# Patient Record
Sex: FEMALE | URBAN - METROPOLITAN AREA
[De-identification: names, ages, dates, MRNs, and addresses within clinical notes are randomized per-mention and may not be internally consistent; named-entity substitution may affect disease eponyms.]

---

## 2023-10-03 ENCOUNTER — LAB (OUTPATIENT)
Dept: LAB | Facility: LAB | Age: 23
End: 2023-10-03
Payer: COMMERCIAL

## 2023-10-03 DIAGNOSIS — Z77.21 CONTACT WITH AND (SUSPECTED) EXPOSURE TO POTENTIALLY HAZARDOUS BODY FLUIDS: Primary | ICD-10-CM

## 2023-10-03 LAB — HBV SURFACE AB SER-ACNC: 4.8 MIU/ML

## 2023-10-03 PROCEDURE — 36415 COLL VENOUS BLD VENIPUNCTURE: CPT

## 2023-10-04 LAB — HBV SURFACE AG SERPL QL IA: NONREACTIVE

## 2023-12-05 ENCOUNTER — LAB (OUTPATIENT)
Dept: LAB | Facility: LAB | Age: 23
End: 2023-12-05
Payer: COMMERCIAL

## 2023-12-05 DIAGNOSIS — N94.6 DYSMENORRHEA, UNSPECIFIED: ICD-10-CM

## 2023-12-05 DIAGNOSIS — Z83.3 FAMILY HISTORY OF DIABETES MELLITUS: ICD-10-CM

## 2023-12-05 DIAGNOSIS — R53.83 OTHER FATIGUE: Primary | ICD-10-CM

## 2023-12-05 LAB
ALBUMIN SERPL BCP-MCNC: 4.4 G/DL (ref 3.4–5)
ALP SERPL-CCNC: 52 U/L (ref 33–110)
ALT SERPL W P-5'-P-CCNC: 13 U/L (ref 7–45)
ANION GAP SERPL CALC-SCNC: 14 MMOL/L (ref 10–20)
AST SERPL W P-5'-P-CCNC: 15 U/L (ref 9–39)
BILIRUB SERPL-MCNC: 0.3 MG/DL (ref 0–1.2)
BUN SERPL-MCNC: 20 MG/DL (ref 6–23)
CALCIUM SERPL-MCNC: 9.4 MG/DL (ref 8.6–10.6)
CHLORIDE SERPL-SCNC: 106 MMOL/L (ref 98–107)
CO2 SERPL-SCNC: 24 MMOL/L (ref 21–32)
CREAT SERPL-MCNC: 0.74 MG/DL (ref 0.5–1.05)
EST. AVERAGE GLUCOSE BLD GHB EST-MCNC: 97 MG/DL
FSH SERPL-ACNC: 8.1 IU/L
GFR SERPL CREATININE-BSD FRML MDRD: >90 ML/MIN/1.73M*2
GLUCOSE SERPL-MCNC: 82 MG/DL (ref 74–99)
HBA1C MFR BLD: 5 %
LH SERPL-ACNC: 4.4 IU/L
POTASSIUM SERPL-SCNC: 4.2 MMOL/L (ref 3.5–5.3)
PROT SERPL-MCNC: 7.2 G/DL (ref 6.4–8.2)
SODIUM SERPL-SCNC: 140 MMOL/L (ref 136–145)
TSH SERPL-ACNC: 1.77 MIU/L (ref 0.44–3.98)

## 2023-12-05 PROCEDURE — 80053 COMPREHEN METABOLIC PANEL: CPT

## 2023-12-05 PROCEDURE — 83036 HEMOGLOBIN GLYCOSYLATED A1C: CPT

## 2023-12-05 PROCEDURE — 83002 ASSAY OF GONADOTROPIN (LH): CPT

## 2023-12-05 PROCEDURE — 83001 ASSAY OF GONADOTROPIN (FSH): CPT

## 2023-12-05 PROCEDURE — 36415 COLL VENOUS BLD VENIPUNCTURE: CPT

## 2023-12-05 PROCEDURE — 84443 ASSAY THYROID STIM HORMONE: CPT

## 2025-03-28 ENCOUNTER — HOSPITAL ENCOUNTER (OUTPATIENT)
Dept: RADIOLOGY | Facility: HOSPITAL | Age: 25
Discharge: HOME | End: 2025-03-28
Payer: COMMERCIAL

## 2025-03-28 DIAGNOSIS — N93.9 ABNORMAL UTERINE AND VAGINAL BLEEDING, UNSPECIFIED: ICD-10-CM

## 2025-03-28 PROCEDURE — 76856 US EXAM PELVIC COMPLETE: CPT

## 2025-06-16 ENCOUNTER — APPOINTMENT (OUTPATIENT)
Dept: OBSTETRICS AND GYNECOLOGY | Facility: CLINIC | Age: 25
End: 2025-06-16
Payer: COMMERCIAL

## 2025-06-16 VITALS — SYSTOLIC BLOOD PRESSURE: 118 MMHG | WEIGHT: 260.2 LBS | DIASTOLIC BLOOD PRESSURE: 79 MMHG | HEART RATE: 88 BPM

## 2025-06-16 DIAGNOSIS — Z23 NEED FOR HPV VACCINATION: ICD-10-CM

## 2025-06-16 DIAGNOSIS — N92.6 IRREGULAR MENSES: Primary | ICD-10-CM

## 2025-06-16 PROCEDURE — 99203 OFFICE O/P NEW LOW 30 MIN: CPT | Performed by: NURSE PRACTITIONER

## 2025-06-16 PROCEDURE — 90471 IMMUNIZATION ADMIN: CPT | Performed by: NURSE PRACTITIONER

## 2025-06-16 PROCEDURE — 1036F TOBACCO NON-USER: CPT | Performed by: NURSE PRACTITIONER

## 2025-06-16 PROCEDURE — 99213 OFFICE O/P EST LOW 20 MIN: CPT | Mod: 25 | Performed by: NURSE PRACTITIONER

## 2025-06-16 RX ORDER — SERTRALINE HYDROCHLORIDE 150 MG/1
CAPSULE ORAL
COMMUNITY

## 2025-06-16 ASSESSMENT — PATIENT HEALTH QUESTIONNAIRE - PHQ9
1. LITTLE INTEREST OR PLEASURE IN DOING THINGS: SEVERAL DAYS
2. FEELING DOWN, DEPRESSED OR HOPELESS: NOT AT ALL
SUM OF ALL RESPONSES TO PHQ9 QUESTIONS 1 AND 2: 1

## 2025-06-16 ASSESSMENT — ENCOUNTER SYMPTOMS
MUSCULOSKELETAL NEGATIVE: 0
RESPIRATORY NEGATIVE: 0
EYES NEGATIVE: 0
HEMATOLOGIC/LYMPHATIC NEGATIVE: 0
CONSTITUTIONAL NEGATIVE: 0
CARDIOVASCULAR NEGATIVE: 0
ENDOCRINE NEGATIVE: 0
NEUROLOGICAL NEGATIVE: 0
GASTROINTESTINAL NEGATIVE: 0
PSYCHIATRIC NEGATIVE: 0
ALLERGIC/IMMUNOLOGIC NEGATIVE: 0

## 2025-06-16 ASSESSMENT — PAIN SCALES - GENERAL: PAINLEVEL_OUTOF10: 0-NO PAIN

## 2025-06-16 NOTE — PROGRESS NOTES
Roxi Lieberman is a 25 y.o. female who presents for New Patient Visit (Patient Is here today in clinic for multiple concerns . Irregular cycles, bleeding between cycles , Headaches /Patient stated she was bleeding for 30 days . Pain with sex /No pain /LMP Sometime in may /No falls /Need pap /STD declined ).    HPI    Patient presents to discuss AUB and establish care. She reports periods have been irregular since 2020; occasionally skipping months, now having 30-40 day cycles with intermittent spotting in between periods. Typically has about 5 days of bleeding with second day being heaviest. She had 1 episode of prolonged bleeding for ~30 days in March. At that time, she had work up at OSH that included pelvic US and blood work. Pelvic US report visible in chart and unremarkable, however report notes that ovaries are not discretely visualized. States she had normal TSH, testosterone, and HgbA1C results, but unable to see lab records. Reports regular cycles from menarche (age 9) through early 20s. Additionally, patient reports hirsutism.     She also endorses pelvic floor dysfunction, was previously going to pelvic floor PT for urinary incontinence but was unable to keep up with recommended exercises. She denies any prior sexual activity (penetrative or otherwise). She reports pain with use of vibrator. No prior pelvic exam or pap testing.     No significant family history. Desires HPV vaccine series.     OB History    No obstetric history on file.        Objective   /79   Pulse 88   Wt 118 kg (260 lb 3.2 oz)   LMP  (LMP Unknown)   Physical Exam  Constitutional:       Appearance: Normal appearance.   Cardiovascular:      Rate and Rhythm: Normal rate and regular rhythm.   Pulmonary:      Effort: Pulmonary effort is normal.      Breath sounds: Normal breath sounds.   Abdominal:      Palpations: Abdomen is soft.      Tenderness: There is no abdominal tenderness.   Neurological:      General: No focal  deficit present.      Mental Status: She is alert and oriented to person, place, and time. Mental status is at baseline.   Skin:     General: Skin is warm and dry.   Psychiatric:         Mood and Affect: Mood normal.         Behavior: Behavior normal.         Thought Content: Thought content normal.         Judgment: Judgment normal.       Assessment/Plan   Diagnoses and all orders for this visit:  Irregular menses  Need for HPV vaccination  -     HPV 9-valent vaccine (GARDASIL 9)    Discussed likely diagnosis of PCOS given irregular menses and hirsutism, as well as typical first line management with use of contraception to regulate periods or use of provera to induce menses if amenorrheic x3-4 months. Also discussed option of spironolactone for treatment of hirsutism. Patient declines treatment at this time, will readdress if menses become more infrequent.     Offered repeat US, patient would like to defer at this time but open to repeating if symptoms worsen. Will send request of records to prior provider to review serum testing and repeat if indicated.     Discussed recommendation for pap starting at the age of 21 regardless of sexual activity given small potential risk of HPV exposure, through shared decision making will defer for now. Encouraged patient to return when ready.     Encouraged to return to pelvic floor therapy, patient interested but will defer for now as she is planning to move soon. She will reach out when desired to restart. Discussed option of nutrition referral, patient defers for now, but will reach out when ready.     Follow up in about 1 year (around 6/16/2026), or if symptoms worsen or fail to improve, for annual exam.  Lesly Dumont, APRN-CNM, APRN-CNP

## 2025-06-16 NOTE — PATIENT INSTRUCTIONS
HPV Vaccine Schedule  Dose #1 (given today)  Dose #2 1-2 months after first dose  Dose #3 6 months after first dose

## 2025-06-16 NOTE — PROGRESS NOTES
Hpv vaccine per Lesly MCARTHUR   Given IM into right deltoid  Supplied by office  Patient tolerated well  VIS given     LOT #: [id number]  Expiration date: 1/28/27

## 2025-07-02 ENCOUNTER — APPOINTMENT (OUTPATIENT)
Dept: PRIMARY CARE | Facility: CLINIC | Age: 25
End: 2025-07-02
Payer: COMMERCIAL

## 2025-07-02 VITALS
WEIGHT: 275.9 LBS | BODY MASS INDEX: 40.87 KG/M2 | TEMPERATURE: 97.1 F | HEIGHT: 69 IN | DIASTOLIC BLOOD PRESSURE: 78 MMHG | SYSTOLIC BLOOD PRESSURE: 112 MMHG | OXYGEN SATURATION: 97 % | HEART RATE: 101 BPM

## 2025-07-02 DIAGNOSIS — F32.A MILD DEPRESSION: ICD-10-CM

## 2025-07-02 DIAGNOSIS — G43.009 MIGRAINE WITHOUT AURA AND WITHOUT STATUS MIGRAINOSUS, NOT INTRACTABLE: ICD-10-CM

## 2025-07-02 DIAGNOSIS — Z00.00 ROUTINE GENERAL MEDICAL EXAMINATION AT A HEALTH CARE FACILITY: Primary | ICD-10-CM

## 2025-07-02 DIAGNOSIS — E55.9 VITAMIN D DEFICIENCY: ICD-10-CM

## 2025-07-02 DIAGNOSIS — R21 RASH: ICD-10-CM

## 2025-07-02 DIAGNOSIS — F41.9 ANXIETY: ICD-10-CM

## 2025-07-02 PROBLEM — G43.909 MIGRAINE: Status: ACTIVE | Noted: 2025-07-02

## 2025-07-02 PROBLEM — N94.6 DYSMENORRHEA: Status: ACTIVE | Noted: 2024-02-08

## 2025-07-02 PROBLEM — M62.838 LEVATOR SPASM: Status: ACTIVE | Noted: 2024-02-08

## 2025-07-02 PROCEDURE — 3008F BODY MASS INDEX DOCD: CPT | Performed by: NURSE PRACTITIONER

## 2025-07-02 PROCEDURE — 1036F TOBACCO NON-USER: CPT | Performed by: NURSE PRACTITIONER

## 2025-07-02 PROCEDURE — 99385 PREV VISIT NEW AGE 18-39: CPT | Performed by: NURSE PRACTITIONER

## 2025-07-02 RX ORDER — SERTRALINE HYDROCHLORIDE 100 MG/1
200 TABLET, FILM COATED ORAL DAILY
Qty: 180 TABLET | Refills: 1 | Status: SHIPPED | OUTPATIENT
Start: 2025-07-02 | End: 2025-12-29

## 2025-07-02 ASSESSMENT — ENCOUNTER SYMPTOMS
MYALGIAS: 0
COUGH: 0
BLOOD IN STOOL: 0
PALPITATIONS: 0
UNEXPECTED WEIGHT CHANGE: 0
APPETITE CHANGE: 0
DYSURIA: 0
DIARRHEA: 0
NAUSEA: 0
VOMITING: 0
FREQUENCY: 0
BACK PAIN: 0

## 2025-07-02 ASSESSMENT — PAIN SCALES - GENERAL: PAINLEVEL_OUTOF10: 0-NO PAIN

## 2025-07-02 NOTE — PROGRESS NOTES
Subjective   Luisana Lo is a 25 y.o. female and is here for a comprehensive physical exam. The patient reports establish care.    History of Present Illness  The patient presents for a new patient visit.    She has a history of migraines, which she manages with aspirin and ibuprofen due to adverse reactions to prescription medications. Lifestyle modifications have also been beneficial in controlling her migraines. She has not consulted a neurologist for her migraines. Triggers: Menstrual migraines, foods, artificial sweeteners, caffeine  Never on OCPs due to potential side effects.  Migraine pain occures in Right eye, Right parietal and occiput, cheek/jaw.  +nausea, +vomit, espescially when triggered by eating  +photo, +phono  No AURA-  Pain++  Weakness in arms and legs during migraine episodes  2019 - Head CT normal  Her diet is varied, and she maintains a regular eating schedule to manage her migraines, aiming for 30 g of protein per meal. She primarily consumes animal-based proteins, including red meat, chicken, eggs, cheese, and nuts. She avoids caffeine due to its association with her migraines - 30 minutes after consumption migraines onset. She experiences nausea, vomiting, light sensitivity, and weakness in her arms and legs during migraine episodes. Her last attempt at migraine medication was two years ago.    She is currently under the care of a gynecologist for a potential diagnosis of polycystic ovary syndrome (PCOS), as she exhibits symptoms such as dysmenorrhea, amenorrhea, and prolonged menstrual periods. She has expressed concerns about excessive body hair and hair loss on her head, which her gynecologist attributes to genetics. Her testosterone levels are normal. She has not undergone blood work with her gynecologist. She has never had a Pap smear. She has not been diagnosed with premenstrual dysphoric disorder (PMDD) but does experience some premenstrual syndrome (PMS) symptoms. Her last  menstrual period started on 06/15/2025.    She has been dealing with depression for approximately a decade, managed with sertraline. She does not see a psychiatrist but rather her general practitioner. She reports that her depression is currently well-managed, although she experienced significant stress in June 2025 due to personal issues. She has previously tried counseling for her depression, which was more beneficial for her anxiety. She has also tried other antidepressants but discontinued them due to side effects. She is hesitant to take medications in general due to her tendency to experience side effects. She has a history of obsessive-compulsive disorder (OCD) from childhood, which no longer significantly impacts her life. She has been diagnosed with major depressive disorder and persistent depressive disorder. She also has generalized anxiety disorder, which is managed with counseling. She had a prescription for Xanax for panic attacks but was able to manage this through counseling and does not believe she ever took it. She takes sertraline 200 mg daily, an increased dose from her usual 150 mg due to recent stressors.  Currently lives with mom. Has been trying to move out. Mom recently broke her leg last month.     Has been taking trazodone fpr insomnia x 5 years du to difficulty falling and staying asleep. Sleep hygeine changes were not effective.    She has been experiencing chronic fatigue since the age of 15, which her previous doctor attributed to depression and a side effect of her antidepressant. 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 She has undergone blood work in the past, including thyroid and testosterone tests, which were normal. Her A1c level was also normal. She maintains a balanced diet and takes supplements including melatonin as needed, magnesium glycinate, zinc, vitamin D, and folate.    She has concerns about her ear, which  she believes may be due to wax buildup. She has been informed that her ear canal is unusually small and that she has small eustachian tubes. She has had moderate wax issues in the past but not to the extent that it affected her hearing. She has tried home remedies such as oil drops in the ear, which have provided some relief. She uses Flonase for her eustachian tube issues, which takes a few days to work. She does not experience itchy eyes or a runny nose but does have a cough. She has a history of recurrent ear infections as a child but not as an adult.    She experiences difficulty breathing during cardio exercises, which she believes may be due to exercise-induced asthma, although this has not been pursued further. She also experiences shin splints and ankle pain due to poor knees and ankles. She does not experience lightheadedness or dizziness but does report chest pain and difficulty breathing in and out. She does not experience respiratory issues when ill or with colds. She had bronchitis frequently as a child but not as an adult. She has not had recent imaging of her chest or heart.    She experiences constipation due to pelvic floor issues, which she has had since childhood. This has resulted in hemorrhoids since childhood. Reports lack of anus opening. She has jobs where she would not use the bathroom for up to 10 hours due to stress, leading to urinary tract infections (UTIs) and constipation. She has not been sexually active but has experienced pain during masturbation. She has started physical therapy for this issue but was unable to complete it.    She has noticed patches on her scalp that resemble her father's scalp psoriasis patches. She uses tea tree oil and eucalyptus shampoos, which seem to help. She has not been tested for autoimmune disorders.    She experiences ankle swelling, which she attributes to sodium intake and not sleeping with her feet elevated. The swelling improves with elevation or  overnight, except when it occurs on an airplane, where it takes longer to resolve. She also experiences numbness, tingling, and pain when the swelling occurs, which resolves once the swelling subsides.    Last blood work 3/2025, reviewed with patient.  Reports borderline elevation of cholesterol in the past.    Walks for exercise - tolerates fairly.  Weight training.    GYNECOLOGICAL HISTORY:  Last Menstrual Period: 06/15/2025  Menstrual Pain: Yes  Has never had a PAP smear    PAST SURGICAL HISTORY:  She had chickenpox at age 15. She had her wisdom teeth removed and a mole excised.    FAMILY HISTORY  Her father and grandfather had skin cancer. Depression and anxiety are present in her sister, brother, father, and all four grandparents. Her maternal side has a history of colon cancer, though her mother and maternal grandparents do not have it. Her maternal grandfather had heart failure in his 60s, and her maternal grandmother also had heart failure. Diverticulitis/diverticulosis is present in both paternal and maternal grandmothers. Both sides of her family have a history of diabetes. Both parents have high blood pressure, and her father has high cholesterol. Her father experienced an allergic reaction to statins, leading to significant muscular atrophy and immunocompromised status. Late onset trigeminal neuralgia is present in her maternal family, with diagnoses occurring in their 40s to 70s.         History:  LMP: Patient's last menstrual period was 06/15/2025 (approximate).  Menopause at NA years  Last pap date: never  Abnormal pap? not asked  : 0    Vision has been good  Dental UTD  Plans to see dental at [employer] - works there    Reaction to Sodium lauryl sulfate, sodium lauryl sulfoacetate and sodium laurite detergents    Supplements:   Melatonin as needed  Magnesium glycinate 50% value  Zinc 40mg  Folate 400mg  Calcium taken with Vit D  Vit D 2000 international units ?    Do you have pain that bothers you  in your daily life? no    Review of Systems   Constitutional:  Positive for fatigue. Negative for appetite change and unexpected weight change.   HENT:  Positive for ear pain. Negative for congestion and hearing loss.    Eyes:  Negative for visual disturbance.   Respiratory:  Positive for shortness of breath. Negative for cough.    Cardiovascular:  Positive for leg swelling. Negative for chest pain and palpitations.   Gastrointestinal:  Positive for abdominal pain and constipation. Negative for blood in stool, diarrhea, nausea and vomiting.   Genitourinary:  Negative for dysuria, frequency and urgency.   Musculoskeletal:  Positive for arthralgias. Negative for back pain and myalgias.   Skin:  Positive for rash.   Neurological:  Positive for headaches. Negative for syncope.   Psychiatric/Behavioral:  The patient is nervous/anxious.         Objective   Physical Exam  Vitals and nursing note reviewed.   Constitutional:       General: She is not in acute distress.     Appearance: Normal appearance. She is not toxic-appearing.   HENT:      Head: Normocephalic.      Right Ear: Tympanic membrane, ear canal and external ear normal.      Left Ear: Tympanic membrane, ear canal and external ear normal.      Ears:      Comments: Narrow canals     Nose: Nose normal.      Mouth/Throat:      Mouth: Mucous membranes are moist.      Pharynx: Oropharynx is clear.     Eyes:      Conjunctiva/sclera: Conjunctivae normal.     Neck:      Thyroid: No thyromegaly.     Cardiovascular:      Rate and Rhythm: Normal rate and regular rhythm.      Pulses: Normal pulses.      Heart sounds: Normal heart sounds. No murmur heard.     Comments: Trace to 1+  Pulmonary:      Effort: Pulmonary effort is normal. No respiratory distress.      Breath sounds: Normal breath sounds.   Abdominal:      General: Bowel sounds are normal.      Palpations: Abdomen is soft.      Tenderness: There is no abdominal tenderness.     Musculoskeletal:         General:  Normal range of motion.      Cervical back: Neck supple.      Right lower le+ Pitting Edema present.      Left lower le+ Pitting Edema present.   Lymphadenopathy:      Cervical: No cervical adenopathy.     Skin:     General: Skin is warm and dry.      Capillary Refill: Capillary refill takes less than 2 seconds.      Findings: Rash (scalp: thinning hair, pink patches) present.     Neurological:      General: No focal deficit present.      Gait: Gait normal.     Psychiatric:         Mood and Affect: Mood normal.         Judgment: Judgment normal.         Assessment/Plan   Healthy female exam.   Assessment & Plan  1. Migraines: Chronic.  - Manages migraines with aspirin and ibuprofen due to side effects from prescription medications.  - Experiences nausea, vomiting, light sensitivity, and weakness in arms and legs during migraine episodes.  - Magnesium supplementation between 200-400 mg recommended for benefits on mood, sleep, and migraines.  - Last tried prescription medications for migraines about 2 years ago.    2. Potential polycystic ovary syndrome (PCOS).  - Working with a gynecologist for possible PCOS diagnosis.  - Recent blood work for thyroid and testosterone levels were normal.    3. Depression: Acute on Chronic.  - Managing depression with sertraline for about a decade, currently on 200 mg daily due to increased stress.  - Counseling was effective for anxiety but not for depression.  - Considering blood work to check vitamin D, folic acid, B12, and iron levels due to chronic fatigue.    4. Chronic fatigue.  - Experienced chronic fatigue since age 15, possibly related to depression and side effects of antidepressants.  - Blood work will be conducted to check vitamin D levels, folic acid, B12, and iron levels.  - Previous blood work for thyroid and testosterone levels were normal.    5. Ear canal issues.  - Reports difficulty hearing in one ear, possibly due to wax buildup or small ear canal size.  -  Flonase recommended for use.  - No wax observed during examination.    6. Exercise-induced asthma.  - Experiences difficulty breathing during cardio exercises, possibly due to exercise-induced asthma.  - No history of asthma medication use.    7. Pelvic floor dysfunction.  - Pelvic floor issues leading to constipation and hemorrhoids since childhood.  - Started physical therapy but was unable to complete it.  - Gynecologist will write a recommendation for resuming physical therapy.    8. Scalp psoriasis.  - Uses tea tree oil and eucalyptus shampoos for patches resembling father's scalp psoriasis.  - No formal diagnosis of autoimmune disorders.    9. Ankle swelling.  - Experiences ankle swelling, especially after long periods of sitting or traveling.  - Swelling improves with elevation.  - No other areas of swelling reported.    Follow-up  - Blood work to check vitamin D, folic acid, B12, and iron levels.  - Prescription for sertraline 200 mg daily sent to [facility].     2. Patient Counseling:  --Nutrition: Stressed importance of moderation in sodium/caffeine intake, saturated fat and cholesterol, caloric balance, sufficient intake of fresh fruits, vegetables, fiber, calcium, iron, and 1 mg of folate supplement per day (for females capable of pregnancy).  --Discussed the issue of estrogen replacement, calcium supplement, and the daily use of baby aspirin.  --Exercise: Stressed the importance of regular exercise.   --Substance Abuse: Discussed cessation/primary prevention of tobacco, alcohol, or other drug use; driving or other dangerous activities under the influence; availability of treatment for abuse.    --Sexuality: Discussed sexually transmitted diseases, partner selection, use of condoms, avoidance of unintended pregnancy  and contraceptive alternatives.   --Injury prevention: Discussed safety belts, safety helmets, smoke detector, smoking near bedding or upholstery.   --Dental health: Discussed importance of  regular tooth brushing, flossing, and dental visits.  --Immunizations reviewed.  --Discussed benefits of screening colonoscopy.  --After hours service discussed with patient  3. Discussed the patient's BMI with her.  The BMI is above average. The patient received Provided instructions on dietary changes  Provided instructions on exercise  Advised to Increase physical activity because they have an above normal BMI.  4. Follow up next physical in 1 year    Patient Instructions   It is important that you have yearly check-ups with your healthcare provider to ensure that you stay up to date on your health, screenings, and vaccinations, even if you feel healthy.     Make sure you eat a diet rich in fruits, vegetables, nuts, beans, whole grains, lean meat, eggs, calcium, and good fats (i.e. olive oil, avocado baked fish). AVOID a diet that is high in red meat, trans- and saturated fats, sweetened beverages, and refined grains (i.e. white bread, rice, sweetened cereals).     Stay hydrated with at least 64 ounces of water daily.    Exercise most days per week, for at least 30-45 minutes per session.     Be sure to wear sunscreen of SPF of 15 or higher if you are going to be outside.    Stress management and coping skills are important to manage our stress levels. Some tips include keep in mind that stress isn't a bad thing, talk about your problems, prioritize your responsibilities, focus on the basics, don't put all your eggs in one basket, set aside time for yourself, and keep things in perspective.     Stay up to date with annual eye exams and twice yearly dental exams.    Recommend annual flu vaccination, in addition to age appropriate recommended vaccines.    PAP smear is recommended every 2-3 years for women 21-29 and every 3-5 years for women 30-65.  Continue to follow up with Gynecology for PCOS work up and to schedule PAP    Blood work today to evaluate for underlying causes of your symptoms    Continue sertraline  daily  Check vitamin levels to rule out other underlying triggers  You should be taking magnesium glycinate 200 to 400mg daily (if tolerated - can cause diarrhea)  Continue vitamin D    Follow up for evaluation of depression/anxiety in 6 months, sooner as needed     This medical note was created with the assistance of artificial intelligence (AI) for documentation purposes. The content has been reviewed and confirmed by the healthcare provider for accuracy and completeness. Patient consented to the use of audio recording and use of AI during their visit.

## 2025-07-02 NOTE — PATIENT INSTRUCTIONS
It is important that you have yearly check-ups with your healthcare provider to ensure that you stay up to date on your health, screenings, and vaccinations, even if you feel healthy.     Make sure you eat a diet rich in fruits, vegetables, nuts, beans, whole grains, lean meat, eggs, calcium, and good fats (i.e. olive oil, avocado baked fish). AVOID a diet that is high in red meat, trans- and saturated fats, sweetened beverages, and refined grains (i.e. white bread, rice, sweetened cereals).     Stay hydrated with at least 64 ounces of water daily.    Exercise most days per week, for at least 30-45 minutes per session.     Be sure to wear sunscreen of SPF of 15 or higher if you are going to be outside.    Stress management and coping skills are important to manage our stress levels. Some tips include keep in mind that stress isn't a bad thing, talk about your problems, prioritize your responsibilities, focus on the basics, don't put all your eggs in one basket, set aside time for yourself, and keep things in perspective.     Stay up to date with annual eye exams and twice yearly dental exams.    Recommend annual flu vaccination, in addition to age appropriate recommended vaccines.    PAP smear is recommended every 2-3 years for women 21-29 and every 3-5 years for women 30-65.  Continue to follow up with Gynecology for PCOS work up and to schedule PAP    Blood work today to evaluate for underlying causes of your symptoms    Continue sertraline daily  Check vitamin levels to rule out other underlying triggers  You should be taking magnesium glycinate 200 to 400mg daily (if tolerated - can cause diarrhea)  Continue vitamin D    Follow up for evaluation of depression/anxiety in 6 months, sooner as needed

## 2025-07-03 DIAGNOSIS — E61.1 IRON DEFICIENCY: Primary | ICD-10-CM

## 2025-07-03 RX ORDER — FERROUS SULFATE 325(65) MG
325 TABLET, DELAYED RELEASE (ENTERIC COATED) ORAL
Qty: 30 TABLET | Refills: 5 | Status: SHIPPED | OUTPATIENT
Start: 2025-07-03 | End: 2025-12-30

## 2025-07-05 LAB
25(OH)D3+25(OH)D2 SERPL-MCNC: 34 NG/ML (ref 30–100)
ALBUMIN SERPL-MCNC: 4.5 G/DL (ref 3.6–5.1)
ALP SERPL-CCNC: 50 U/L (ref 31–125)
ALT SERPL-CCNC: 13 U/L (ref 6–29)
ANA PAT SER IF-IMP: ABNORMAL
ANA PAT SER IF-IMP: ABNORMAL
ANA SER QL IF: POSITIVE
ANA TITR SER IF: ABNORMAL TITER
ANA TITR SER IF: ABNORMAL TITER
ANION GAP SERPL CALCULATED.4IONS-SCNC: 12 MMOL/L (CALC) (ref 7–17)
AST SERPL-CCNC: 15 U/L (ref 10–30)
BILIRUB SERPL-MCNC: 0.4 MG/DL (ref 0.2–1.2)
BUN SERPL-MCNC: 14 MG/DL (ref 7–25)
CALCIUM SERPL-MCNC: 9.3 MG/DL (ref 8.6–10.2)
CHLORIDE SERPL-SCNC: 105 MMOL/L (ref 98–110)
CO2 SERPL-SCNC: 23 MMOL/L (ref 20–32)
CREAT SERPL-MCNC: 0.72 MG/DL (ref 0.5–0.96)
CRP SERPL-MCNC: <3 MG/L
DSDNA AB SER-ACNC: ABNORMAL [IU]/ML
EGFRCR SERPLBLD CKD-EPI 2021: 117 ML/MIN/1.73M2
ENA RNP AB SER-ACNC: ABNORMAL
ENA SM AB SER IA-ACNC: ABNORMAL
ENA SM+RNP AB SER IA-ACNC: ABNORMAL
ERYTHROCYTE [SEDIMENTATION RATE] IN BLOOD BY WESTERGREN METHOD: NORMAL MM/H
FERRITIN SERPL-MCNC: 21 NG/ML (ref 16–154)
GLUCOSE SERPL-MCNC: 89 MG/DL (ref 65–99)
NUCLEOSOME AB SER IA-ACNC: ABNORMAL
POTASSIUM SERPL-SCNC: 4.9 MMOL/L (ref 3.5–5.3)
PROT SERPL-MCNC: 7.4 G/DL (ref 6.1–8.1)
SODIUM SERPL-SCNC: 140 MMOL/L (ref 135–146)
VIT B12 SERPL-MCNC: 371 PG/ML (ref 200–1100)
WBC # BLD AUTO: NORMAL THOUSAND/UL

## 2025-07-08 LAB
25(OH)D3+25(OH)D2 SERPL-MCNC: 34 NG/ML (ref 30–100)
ALBUMIN SERPL-MCNC: 4.5 G/DL (ref 3.6–5.1)
ALP SERPL-CCNC: 50 U/L (ref 31–125)
ALT SERPL-CCNC: 13 U/L (ref 6–29)
ANA PAT SER IF-IMP: ABNORMAL
ANA PAT SER IF-IMP: ABNORMAL
ANA SER QL IF: POSITIVE
ANA TITR SER IF: ABNORMAL TITER
ANA TITR SER IF: ABNORMAL TITER
ANION GAP SERPL CALCULATED.4IONS-SCNC: 12 MMOL/L (CALC) (ref 7–17)
AST SERPL-CCNC: 15 U/L (ref 10–30)
BILIRUB SERPL-MCNC: 0.4 MG/DL (ref 0.2–1.2)
BUN SERPL-MCNC: 14 MG/DL (ref 7–25)
CALCIUM SERPL-MCNC: 9.3 MG/DL (ref 8.6–10.2)
CENTROMERE B AB SER-ACNC: ABNORMAL AI
CHLORIDE SERPL-SCNC: 105 MMOL/L (ref 98–110)
CO2 SERPL-SCNC: 23 MMOL/L (ref 20–32)
CREAT SERPL-MCNC: 0.72 MG/DL (ref 0.5–0.96)
CRP SERPL-MCNC: <3 MG/L
DSDNA AB SER-ACNC: 1 IU/ML
EGFRCR SERPLBLD CKD-EPI 2021: 117 ML/MIN/1.73M2
ENA JO1 AB SER IA-ACNC: ABNORMAL AI
ENA RNP AB SER-ACNC: ABNORMAL AI
ENA SCL70 AB SER IA-ACNC: ABNORMAL AI
ENA SM AB SER IA-ACNC: ABNORMAL AI
ENA SM+RNP AB SER IA-ACNC: ABNORMAL AI
ENA SS-A AB SER IA-ACNC: ABNORMAL AI
ENA SS-B AB SER IA-ACNC: ABNORMAL AI
ERYTHROCYTE [SEDIMENTATION RATE] IN BLOOD BY WESTERGREN METHOD: NORMAL MM/H
FERRITIN SERPL-MCNC: 21 NG/ML (ref 16–154)
GLUCOSE SERPL-MCNC: 89 MG/DL (ref 65–99)
LABORATORY COMMENT REPORT: ABNORMAL
NUCLEOSOME AB SER IA-ACNC: ABNORMAL AI
POTASSIUM SERPL-SCNC: 4.9 MMOL/L (ref 3.5–5.3)
PROT SERPL-MCNC: 7.4 G/DL (ref 6.1–8.1)
RIBOSOMAL P AB SER-ACNC: ABNORMAL AI
SODIUM SERPL-SCNC: 140 MMOL/L (ref 135–146)
VIT B12 SERPL-MCNC: 371 PG/ML (ref 200–1100)
WBC # BLD AUTO: NORMAL THOUSAND/UL

## 2025-07-15 ENCOUNTER — OFFICE VISIT (OUTPATIENT)
Dept: PRIMARY CARE | Facility: CLINIC | Age: 25
End: 2025-07-15
Payer: COMMERCIAL

## 2025-07-15 VITALS
BODY MASS INDEX: 38.42 KG/M2 | SYSTOLIC BLOOD PRESSURE: 122 MMHG | HEART RATE: 80 BPM | WEIGHT: 259.4 LBS | OXYGEN SATURATION: 98 % | TEMPERATURE: 97.5 F | HEIGHT: 69 IN | DIASTOLIC BLOOD PRESSURE: 80 MMHG

## 2025-07-15 DIAGNOSIS — J20.9 ACUTE BRONCHITIS, UNSPECIFIED ORGANISM: Primary | ICD-10-CM

## 2025-07-15 PROCEDURE — 3008F BODY MASS INDEX DOCD: CPT | Performed by: NURSE PRACTITIONER

## 2025-07-15 PROCEDURE — 99213 OFFICE O/P EST LOW 20 MIN: CPT | Performed by: NURSE PRACTITIONER

## 2025-07-15 PROCEDURE — 1036F TOBACCO NON-USER: CPT | Performed by: NURSE PRACTITIONER

## 2025-07-15 RX ORDER — AZITHROMYCIN 250 MG/1
TABLET, FILM COATED ORAL
Qty: 6 TABLET | Refills: 0 | Status: SHIPPED | OUTPATIENT
Start: 2025-07-15 | End: 2025-07-20

## 2025-07-15 RX ORDER — ALBUTEROL SULFATE 90 UG/1
2 INHALANT RESPIRATORY (INHALATION) EVERY 4 HOURS PRN
Qty: 18 G | Refills: 1 | Status: SHIPPED | OUTPATIENT
Start: 2025-07-15 | End: 2025-08-14

## 2025-07-15 RX ORDER — FERROUS SULFATE 325(65) MG
TABLET ORAL
COMMUNITY
Start: 2025-07-03

## 2025-07-15 ASSESSMENT — PATIENT HEALTH QUESTIONNAIRE - PHQ9
SUM OF ALL RESPONSES TO PHQ9 QUESTIONS 1 AND 2: 0
2. FEELING DOWN, DEPRESSED OR HOPELESS: NOT AT ALL
1. LITTLE INTEREST OR PLEASURE IN DOING THINGS: NOT AT ALL

## 2025-07-15 NOTE — PATIENT INSTRUCTIONS
Continue albuterol as needed for wheezing, chest tightness, and cough  Start Zpak for chest congestion  Increase fluids and rest  Can use mucinex or robitussin if chest congestion not clearing with antibiotics and fluids, inhaler  Follow up if symptoms not improving with treatment.

## 2025-07-15 NOTE — PROGRESS NOTES
"Subjective   Patient ID: Luisana Lo is a 25 y.o. female who presents for URI.    Presents for Cough since 7/4  Initially upper respiratory symptoms, which has since resolved  Has been having coughing fits causing vomiting, lightheadedness, incontinence of urine  Initially thought was an Allergy - allergy medication did not help  Most of nasal congestion better at this time.  Cough - occasionally moist with chest tightness  Has used Benadryl at bedtime  Using old albuterol inhaler, helps at night - last used last evening - was able to sleep  Denies any fever, sore throat at this time.    URI          Review of Systems    Objective     /80 (BP Location: Right arm, Patient Position: Sitting, BP Cuff Size: Large adult)   Pulse 80   Temp 36.4 °C (97.5 °F) (Temporal)   Ht 1.753 m (5' 9\")   Wt 118 kg (259 lb 6.4 oz)   LMP 06/15/2025 (Approximate)   SpO2 98%   BMI 38.31 kg/m²      Physical Exam  Vitals and nursing note reviewed.   Constitutional:       General: She is not in acute distress.     Appearance: She is ill-appearing. She is not toxic-appearing.   HENT:      Head: Normocephalic.      Right Ear: Tympanic membrane, ear canal and external ear normal.      Left Ear: Tympanic membrane, ear canal and external ear normal.      Nose: Nose normal.      Mouth/Throat:      Mouth: Mucous membranes are moist.      Pharynx: Oropharynx is clear. Posterior oropharyngeal erythema present.      Tonsils: 2+ on the right. 2+ on the left.   Eyes:      Conjunctiva/sclera: Conjunctivae normal.   Cardiovascular:      Rate and Rhythm: Normal rate and regular rhythm.      Heart sounds: Normal heart sounds. No murmur heard.  Pulmonary:      Effort: No respiratory distress.      Breath sounds: Examination of the right-middle field reveals wheezing. Examination of the left-middle field reveals wheezing. Examination of the right-lower field reveals wheezing and rhonchi. Examination of the left-lower field reveals wheezing and " rhonchi. Wheezing and rhonchi present.   Lymphadenopathy:      Cervical: Cervical adenopathy present.   Skin:     General: Skin is warm.      Capillary Refill: Capillary refill takes less than 2 seconds.       Assessment/Plan   Diagnoses and all orders for this visit:  Acute bronchitis, unspecified organism  -     albuterol 90 mcg/actuation inhaler; Inhale 2 puffs every 4 hours if needed for wheezing.  -     azithromycin (Zithromax) 250 mg tablet; Take 2 tablets (500 mg) by mouth once daily for 1 day, THEN 1 tablet (250 mg) once daily for 4 days.      Patient Instructions   Continue albuterol as needed for wheezing, chest tightness, and cough  Start Zpak for chest congestion  Increase fluids and rest  Can use mucinex or robitussin if chest congestion not clearing with antibiotics and fluids, inhaler  Follow up if symptoms not improving with treatment.

## 2025-07-29 ASSESSMENT — ENCOUNTER SYMPTOMS
FATIGUE: 1
HEADACHES: 1
ABDOMINAL PAIN: 1
CONSTIPATION: 1
NERVOUS/ANXIOUS: 1
SHORTNESS OF BREATH: 1
ARTHRALGIAS: 1

## 2025-09-11 ENCOUNTER — APPOINTMENT (OUTPATIENT)
Dept: RHEUMATOLOGY | Facility: CLINIC | Age: 25
End: 2025-09-11
Payer: COMMERCIAL

## 2026-01-09 ENCOUNTER — APPOINTMENT (OUTPATIENT)
Dept: PRIMARY CARE | Facility: CLINIC | Age: 26
End: 2026-01-09
Payer: COMMERCIAL